# Patient Record
Sex: MALE | Race: WHITE | NOT HISPANIC OR LATINO | Employment: FULL TIME | URBAN - METROPOLITAN AREA
[De-identification: names, ages, dates, MRNs, and addresses within clinical notes are randomized per-mention and may not be internally consistent; named-entity substitution may affect disease eponyms.]

---

## 2017-01-11 ENCOUNTER — OFFICE VISIT (OUTPATIENT)
Dept: PEDIATRICS | Facility: CLINIC | Age: 47
End: 2017-01-11
Payer: COMMERCIAL

## 2017-01-11 VITALS
TEMPERATURE: 98.8 F | HEART RATE: 53 BPM | OXYGEN SATURATION: 97 % | DIASTOLIC BLOOD PRESSURE: 86 MMHG | SYSTOLIC BLOOD PRESSURE: 114 MMHG | WEIGHT: 230 LBS | BODY MASS INDEX: 28.6 KG/M2 | HEIGHT: 75 IN

## 2017-01-11 DIAGNOSIS — J06.9 VIRAL URI WITH COUGH: Primary | ICD-10-CM

## 2017-01-11 DIAGNOSIS — J02.8 ACUTE PHARYNGITIS DUE TO OTHER SPECIFIED ORGANISMS: ICD-10-CM

## 2017-01-11 LAB
DEPRECATED S PYO AG THROAT QL EIA: NORMAL
MICRO REPORT STATUS: NORMAL
SPECIMEN SOURCE: NORMAL

## 2017-01-11 PROCEDURE — 99213 OFFICE O/P EST LOW 20 MIN: CPT | Performed by: PHYSICIAN ASSISTANT

## 2017-01-11 PROCEDURE — 87081 CULTURE SCREEN ONLY: CPT | Performed by: PHYSICIAN ASSISTANT

## 2017-01-11 PROCEDURE — 87880 STREP A ASSAY W/OPTIC: CPT | Performed by: PHYSICIAN ASSISTANT

## 2017-01-11 NOTE — PROGRESS NOTES
"  SUBJECTIVE:                                                    Stefano Thompson is a 46 year old male who presents to clinic today for the following health issues:    Acute Illness   Acute illness concerns: URI issues  Onset: 1 month intermittent; symptoms over the past 3 days    Fever: YES- low grade a few weeks ago    Chills/Sweats: YES- slight chills    Headache (location?): no     Sinus Pressure:YES--mild    Conjunctivitis:  no    Ear Pain: YES: both    Rhinorrhea: YES    Congestion: YES    Sore Throat: YES     Cough: YES-productive     Lingering    No sob    Wheeze: no    Decreased Appetite: YES- slight    Nausea: no    Vomiting: no    Diarrhea:  YES- intermittent loose stool     Dysuria/Freq.: no    Fatigue/Achiness: YES--fatigued    Sick/Strep Exposure: no     Therapies Tried and outcome: mucinex, Nyquil cold and flu  No history of asthma, allergies. Nonsmoker.   Work: home     Problem list, Medication list, Allergies, and Medical/Social/Surgical histories reviewed in [facility] and updated as appropriate.    ROS:  ROS otherwise negative    OBJECTIVE:                                                    /86 mmHg  Pulse 53  Temp(Src) 98.8  F (37.1  C) (Oral)  Ht 6' 3\" (1.905 m)  Wt 230 lb (104.327 kg)  BMI 28.75 kg/m2  SpO2 97%  Body mass index is 28.75 kg/(m^2).   GENERAL: alert, no distress  HENT: ear canals- normal; TMs- normal; Nose- normal; Mouth- erythemic posterior pharynx; no sinus tenderness   NECK: no tenderness, no adenopathy  RESP: lungs clear to auscultation - no rales, no rhonchi, no wheezes  CV: regular rates and rhythm, normal S1 S2, no S3 or S4 and no murmur, no click or rub    Diagnostic test results:  Results for orders placed or performed in visit on 01/11/17 (from the past 24 hour(s))   Strep, Rapid Screen   Result Value Ref Range    Specimen Description Throat     Rapid Strep A Screen       NEGATIVE: No Group A streptococcal antigen detected by immunoassay, await   culture report.   "    Micro Report Status FINAL 01/11/2017         ASSESSMENT/PLAN:                                                    (J06.9,  B97.89) Viral URI with cough  (primary encounter diagnosis)  Comment: continue with symptomatic treatment. Call if symptoms persist or worsen.   Plan:     (J02.8) Acute pharyngitis due to other specified organisms  Comment:   Plan: Strep, Rapid Screen, Beta strep group A culture          See Patient Instructions    Hieu Fiore PA-C  [facility]

## 2017-01-11 NOTE — MR AVS SNAPSHOT
"              After Visit Summary   1/11/2017    Stefano Thompson    MRN: [id number]           Patient Information     Date Of Birth          1970        Visit Information        Provider Department      1/11/2017 9:10 AM Hieu Fiore PA-C [facility] [place]        Today's Diagnoses     Acute pharyngitis due to other specified organisms    -  1     Viral URI with cough           Care Instructions    Rapid strep test is negative. Throat culture is pending.     Continue to check temperatures three times daily. Continue with rest and fluids. May use ibuprofen or salt water rinses for symptomatic relief.     Do not share drinks/food with others.     Return to clinic if symptoms persist or worsen.           Follow-ups after your visit        Who to contact     If you have questions or need follow up information about today's clinic visit or your schedule please contact [facility] directly at [phone].  Normal or non-critical lab and imaging results will be communicated to you by MyChart, letter or phone within 4 business days after the clinic has received the results. If you do not hear from us within 7 days, please contact the clinic through [employer] or phone. If you have a critical or abnormal lab result, we will notify you by phone as soon as possible.  Submit refill requests through [employer] or call your pharmacy and they will forward the refill request to us. Please allow 3 business days for your refill to be completed.          Additional Information About Your Visit        MyChart Information     [employer] lets you send messages to your doctor, view your test results, renew your prescriptions, schedule appointments and more. To sign up, go to [email or web address] . Click on \"Log in\" on the left side of the screen, which will take you to the Welcome page. Then click on \"Sign up Now\" on the right side of the page.     You will be asked to enter the access code listed below, as " "well as some personal information. Please follow the directions to create your username and password.     Your access code is: [email or web address]  Expires: 2017  9:58 AM     Your access code will  in 90 days. If you need help or a new code, please call your [place] clinic or [phone].        Care EveryWhere ID     This is your Care EveryWhere ID. This could be used by other organizations to access your [place] medical records  [id number]        Your Vitals Were     Pulse Temperature Height BMI (Body Mass Index) Pulse Oximetry       53 98.8  F (37.1  C) (Oral) 6' 3\" (1.905 m) 28.75 kg/m2 97%        Blood Pressure from Last 3 Encounters:   17 114/86   16 136/64   12/09/15 110/68    Weight from Last 3 Encounters:   17 230 lb (104.327 kg)   16 226 lb (102.513 kg)   12/09/15 228 lb (103.42 kg)              We Performed the Following     Strep, Rapid Screen        Primary Care Provider    None       No address on file        Thank you!     Thank you for choosing [facility] [place]  for your care. Our goal is always to provide you with excellent care. Hearing back from our patients is one way we can continue to improve our services. Please take a few minutes to complete the written survey that you may receive in the mail after your visit with us. Thank you!             Your Updated Medication List - Protect others around you: Learn how to safely use, store and throw away your medicines at www.disposemymeds.org.      Notice  As of 2017  9:58 AM    You have not been prescribed any medications.      "

## 2017-01-11 NOTE — PATIENT INSTRUCTIONS
Rapid strep test is negative. Throat culture is pending.     Continue to check temperatures three times daily. Continue with rest and fluids. May use ibuprofen or salt water rinses for symptomatic relief.     Do not share drinks/food with others.     Return to clinic if symptoms persist or worsen.

## 2017-01-11 NOTE — NURSING NOTE
"Chief Complaint   Patient presents with     URI       Initial /86 mmHg  Pulse 53  Temp(Src) 98.8  F (37.1  C) (Oral)  Ht 6' 3\" (1.905 m)  Wt 230 lb (104.327 kg)  BMI 28.75 kg/m2  SpO2 97% Estimated body mass index is 28.75 kg/(m^2) as calculated from the following:    Height as of this encounter: 6' 3\" (1.905 m).    Weight as of this encounter: 230 lb (104.327 kg).  BP completed using cuff size: large.Brian BARRETT MA      "

## 2017-01-13 LAB
BACTERIA SPEC CULT: NORMAL
MICRO REPORT STATUS: NORMAL
SPECIMEN SOURCE: NORMAL

## 2017-01-17 ENCOUNTER — TELEPHONE (OUTPATIENT)
Dept: PEDIATRICS | Facility: CLINIC | Age: 47
End: 2017-01-17

## 2017-01-17 ENCOUNTER — OFFICE VISIT (OUTPATIENT)
Dept: PEDIATRICS | Facility: CLINIC | Age: 47
End: 2017-01-17
Payer: COMMERCIAL

## 2017-01-17 VITALS
BODY MASS INDEX: 28.6 KG/M2 | OXYGEN SATURATION: 98 % | DIASTOLIC BLOOD PRESSURE: 74 MMHG | SYSTOLIC BLOOD PRESSURE: 122 MMHG | WEIGHT: 230 LBS | HEIGHT: 75 IN | TEMPERATURE: 97.6 F | HEART RATE: 73 BPM

## 2017-01-17 DIAGNOSIS — L50.8 RECURRENT URTICARIA: Primary | ICD-10-CM

## 2017-01-17 PROCEDURE — 99213 OFFICE O/P EST LOW 20 MIN: CPT | Performed by: INTERNAL MEDICINE

## 2017-01-17 RX ORDER — PREDNISONE 20 MG/1
20 TABLET ORAL 2 TIMES DAILY
Qty: 10 TABLET | Refills: 0 | Status: SHIPPED | OUTPATIENT
Start: 2017-01-17 | End: 2019-01-20

## 2017-01-17 NOTE — TELEPHONE ENCOUNTER
Patient calling that he has had hives for a day.They are all over his body and move around. Denies SOB or throat tightness.  States he has traveled and it sometimes happens when he is exposed to different detergents. Wants a prescription for prednisone. States he has taken benadryl and had some left over prednisone 20 mg and took twice yesterday. States it helped but doesn't have any more. Advised he would need to be seen for prednisone and that it was not advisable to take prednisone without seeing a provider first. Appointment scheduled.  Jess Sifuentes RN

## 2017-01-17 NOTE — PATIENT INSTRUCTIONS
INSTRUCTIONS FOR TODAY:     prednisone for 5 days   benadryl 25-50mg each evening to manage itching   Zyrtec 10mg or Allegra 60mg each morning   hypoallergenic detergent- Dreft or other   schedule visit with Allergy    Dr Galo

## 2017-01-17 NOTE — PROGRESS NOTES
"  SUBJECTIVE:                                                    Stefano Thompson is a 46 year old male who presents to clinic today for the following health issues:    Rash      Duration: started yesterday    Description  Location: stomach, legs, back  Itching: mild    Intensity:  moderate    Accompanying signs and symptoms: None    History (similar episodes/previous evaluation): h/o recurrent urticaria past 3-4 years           Had used new detergent recently.  Denies other known allergens.  Denies chest tightness or throat discomfort or lip swelling    Precipitating or alleviating factors:  New exposures:  None  Recent travel: no      Therapies tried and outcome: none       Patient Active Problem List   Diagnosis     CARDIOVASCULAR SCREENING; LDL GOAL LESS THAN 160     Recurrent urticaria     Past Surgical History   Procedure Laterality Date     Graft skin full thickness from extremity         Social History   Substance Use Topics     Smoking status: Never Smoker      Smokeless tobacco: Never Used     Alcohol Use: 0.0 oz/week     0 Standard drinks or equivalent per week      Comment: 3 drinks per week     Family History   Problem Relation Age of Onset     C.A.D. No family hx of      DIABETES Father      type two     Hypertension Mother      CANCER No family hx of            OBJECTIVE:                                                    /74 mmHg  Pulse 73  Temp(Src) 97.6  F (36.4  C) (Oral)  Ht 6' 3\" (1.905 m)  Wt 230 lb (104.327 kg)  BMI 28.75 kg/m2  SpO2 98% Body mass index is 28.75 kg/(m^2).  GENERAL:  alert,  no distress  HENT: oropharynx-normal  Derm: patchy well demarcated areas of erythema with wheals and induration legs, abdomen, back     ASSESSMENT/PLAN:                                                        ICD-10-CM    1. Recurrent urticaria L50.8 predniSONE (DELTASONE) 20 MG tablet     ALLERGY/ASTHMA ADULT REFERRAL      Patient Instructions   INSTRUCTIONS FOR TODAY:     prednisone for 5 " days   benadryl 25-50mg each evening    Zyrtec 10mg or Allegra 60mg each morning   referred to Allergy    Joseph Galo MD  [facility]

## 2017-01-17 NOTE — MR AVS SNAPSHOT
After Visit Summary   1/17/2017    Stefano Thompson    MRN: [id number]           Patient Information     Date Of Birth          1970        Visit Information        Provider Department      1/17/2017 9:20 AM Joseph Galo MD [facility] [place]        Today's Diagnoses     Recurrent urticaria    -  1       Care Instructions    INSTRUCTIONS FOR TODAY:     prednisone for 5 days   benadryl 25-50mg each evening to manage itching   Zyrtec 10mg or Allegra 60mg each morning   hypoallergenic detergent- Dreft or other   schedule visit with Allergy    Dr Galo          Follow-ups after your visit        Additional Services     ALLERGY/ASTHMA ADULT REFERRAL       Your provider has referred you to: [facility]: [facility] [place] [phone]   [email or web address]  [facility]: [facility] [place] [phone]   [email or web address]  [facility]: [facility] [place] [phone]   [email or web address]  [place] [phone]   [email or web address]    Please be aware that coverage of these services is subject to the terms and limitations of your health insurance plan.  Call member services at your health plan with any benefit or coverage questions.      Please bring the following with you to your appointment:    (1) Any X-Rays, CTs or MRIs which have been performed.  Contact the facility where they were done to arrange for  prior to your scheduled appointment.    (2) List of current medications  (3) This referral request   (4) Any documents/labs given to you for this referral                  Who to contact     If you have questions or need follow up information about today's clinic visit or your schedule please contact [facility] directly at [phone].  Normal or non-critical lab and imaging results will be communicated to you by MyChart, letter or phone within 4 business days after the clinic has  "received the results. If you do not hear from us within 7 days, please contact the clinic through [employer] or phone. If you have a critical or abnormal lab result, we will notify you by phone as soon as possible.  Submit refill requests through [employer] or call your pharmacy and they will forward the refill request to us. Please allow 3 business days for your refill to be completed.          Additional Information About Your Visit        [employer] Information     [email or web address] lets you send messages to your doctor, view your test results, renew your prescriptions, schedule appointments and more. To sign up, go to [email or web address] . Click on \"Log in\" on the left side of the screen, which will take you to the Welcome page. Then click on \"Sign up Now\" on the right side of the page.     You will be asked to enter the access code listed below, as well as some personal information. Please follow the directions to create your username and password.     Your access code is: [email or web address]  Expires: 2017  9:58 AM     Your access code will  in 90 days. If you need help or a new code, please call your [place] clinic or [phone].        Care EveryWhere ID     This is your Care EveryWhere ID. This could be used by other organizations to access your [place] medical records  [id number]        Your Vitals Were     Pulse Temperature Height BMI (Body Mass Index) Pulse Oximetry       73 97.6  F (36.4  C) (Oral) 6' 3\" (1.905 m) 28.75 kg/m2 98%        Blood Pressure from Last 3 Encounters:   17 122/74   17 114/86   16 136/64    Weight from Last 3 Encounters:   17 230 lb (104.327 kg)   17 230 lb (104.327 kg)   16 226 lb (102.513 kg)              We Performed the Following     ALLERGY/ASTHMA ADULT REFERRAL          Today's Medication Changes          These changes are accurate as of: 17  9:59 AM.  If you have any questions, ask your nurse or doctor.               Start taking these " medicines.        Dose/Directions    predniSONE 20 MG tablet   Commonly known as:  DELTASONE   Used for:  Recurrent urticaria   Started by:  Joseph Galo MD        Dose:  20 mg   Take 1 tablet (20 mg) by mouth 2 times daily   Quantity:  10 tablet   Refills:  0            Where to get your medicines      These medications were sent to [id number] - [place], [place] - [address], [place] [address]     Phone:  [phone]    - predniSONE 20 MG tablet             Primary Care Provider    None       No address on file        Thank you!     Thank you for choosing [facility] [place]  for your care. Our goal is always to provide you with excellent care. Hearing back from our patients is one way we can continue to improve our services. Please take a few minutes to complete the written survey that you may receive in the mail after your visit with us. Thank you!             Your Updated Medication List - Protect others around you: Learn how to safely use, store and throw away your medicines at www.disposemymeds.org.          This list is accurate as of: 1/17/17  9:59 AM.  Always use your most recent med list.                   Brand Name Dispense Instructions for use    predniSONE 20 MG tablet    DELTASONE    10 tablet    Take 1 tablet (20 mg) by mouth 2 times daily

## 2017-07-27 ENCOUNTER — TRANSFERRED RECORDS (OUTPATIENT)
Dept: HEALTH INFORMATION MANAGEMENT | Facility: CLINIC | Age: 47
End: 2017-07-27

## 2017-08-29 ENCOUNTER — TRANSFERRED RECORDS (OUTPATIENT)
Dept: HEALTH INFORMATION MANAGEMENT | Facility: CLINIC | Age: 47
End: 2017-08-29

## 2017-09-14 ENCOUNTER — TRANSFERRED RECORDS (OUTPATIENT)
Dept: HEALTH INFORMATION MANAGEMENT | Facility: CLINIC | Age: 47
End: 2017-09-14

## 2017-10-12 ENCOUNTER — TRANSFERRED RECORDS (OUTPATIENT)
Dept: HEALTH INFORMATION MANAGEMENT | Facility: CLINIC | Age: 47
End: 2017-10-12

## 2017-10-31 ENCOUNTER — TRANSFERRED RECORDS (OUTPATIENT)
Dept: HEALTH INFORMATION MANAGEMENT | Facility: CLINIC | Age: 47
End: 2017-10-31

## 2018-03-28 ENCOUNTER — TRANSFERRED RECORDS (OUTPATIENT)
Dept: HEALTH INFORMATION MANAGEMENT | Facility: CLINIC | Age: 48
End: 2018-03-28

## 2019-01-20 ENCOUNTER — OFFICE VISIT (OUTPATIENT)
Dept: URGENT CARE | Facility: URGENT CARE | Age: 49
End: 2019-01-20
Payer: COMMERCIAL

## 2019-01-20 VITALS
OXYGEN SATURATION: 97 % | DIASTOLIC BLOOD PRESSURE: 81 MMHG | HEART RATE: 67 BPM | BODY MASS INDEX: 26.87 KG/M2 | SYSTOLIC BLOOD PRESSURE: 132 MMHG | TEMPERATURE: 97.7 F | WEIGHT: 215 LBS

## 2019-01-20 DIAGNOSIS — J01.90 ACUTE SINUSITIS WITH SYMPTOMS > 10 DAYS: Primary | ICD-10-CM

## 2019-01-20 PROCEDURE — 99213 OFFICE O/P EST LOW 20 MIN: CPT | Performed by: PHYSICIAN ASSISTANT

## 2019-01-20 RX ORDER — PREDNISONE 20 MG/1
40 TABLET ORAL DAILY
Qty: 10 TABLET | Refills: 0 | Status: SHIPPED | OUTPATIENT
Start: 2019-01-20 | End: 2019-01-25

## 2019-01-20 NOTE — PROGRESS NOTES
SUBJECTIVE:   Stefano Thompson is a 48 year old male presenting with a chief complaint of  cold sx with congestion and aches for months.  Viral and fighting it off but getting worse over the past weeks.    No SOB or chest pain no real cough .  Took Augmentin recently and was getting better but took far into the course to improve and worsened quickly after done.  Hx of sinus issues  Onset of symptoms was several  month(s) ago.  Course of illness is worsening.    Severity moderate  Current and Associated symptoms: negative other than stated above  Treatment measures tried include Tylenol/Ibuprofen, Fluids, Rest and OTC med.  Predisposing factors include HX of chronic sinusitis.    Past Medical History:   Diagnosis Date     Unspecified sinusitis (chronic)      No current outpatient medications on file.     Social History     Tobacco Use     Smoking status: Never Smoker     Smokeless tobacco: Never Used   Substance Use Topics     Alcohol use: Yes     Alcohol/week: 0.0 oz     Comment: 3 drinks per week       ROS:  Review of systems negative except as stated above.    OBJECTIVE:  /81   Pulse 67   Temp 97.7  F (36.5  C) (Tympanic)   Wt 97.5 kg (215 lb)   SpO2 97%   BMI 26.87 kg/m    GENERAL APPEARANCE: healthy, alert and no distress  EYES: EOMI,  PERRL, conjunctiva clear  HENT: TM's normal bilaterally, nasal turbinates erythematous, swollen, rhinorrhea clear and thick, oral mucous membranes moist, no erythema noted and maxillary sinus tenderness and sx worse on the left   NECK: supple, nontender, no lymphadenopathy  RESP: lungs clear to auscultation - no rales, rhonchi or wheezes  CV: regular rates and rhythm, normal S1 S2, no murmur noted  NEURO: Normal strength and tone, sensory exam grossly normal,  normal speech and mentation  SKIN: no suspicious lesions or rashes    assessment/plan:  (J01.90) Acute sinusitis with symptoms > 10 days  (primary encounter diagnosis)  Comment:   Plan: predniSONE (DELTASONE) 20  MG tablet,         amoxicillin-clavulanate (AUGMENTIN) 875-125 MG         tablet          OTC med for sx relief, hot packs to face, steam and Augmentin and Prednisone as directed.  Follow-up with PCP as needed

## 2019-06-27 ENCOUNTER — OFFICE VISIT (OUTPATIENT)
Dept: PEDIATRICS | Facility: CLINIC | Age: 49
End: 2019-06-27
Payer: COMMERCIAL

## 2019-06-27 VITALS
HEART RATE: 53 BPM | SYSTOLIC BLOOD PRESSURE: 120 MMHG | WEIGHT: 216.9 LBS | OXYGEN SATURATION: 97 % | BODY MASS INDEX: 27.11 KG/M2 | DIASTOLIC BLOOD PRESSURE: 54 MMHG | TEMPERATURE: 98.1 F

## 2019-06-27 DIAGNOSIS — M25.471 RIGHT ANKLE SWELLING: Primary | ICD-10-CM

## 2019-06-27 LAB — D DIMER PPP FEU-MCNC: 0.7 UG/ML FEU (ref 0–0.5)

## 2019-06-27 PROCEDURE — 36415 COLL VENOUS BLD VENIPUNCTURE: CPT | Performed by: FAMILY MEDICINE

## 2019-06-27 PROCEDURE — 99214 OFFICE O/P EST MOD 30 MIN: CPT | Performed by: FAMILY MEDICINE

## 2019-06-27 PROCEDURE — 85379 FIBRIN DEGRADATION QUANT: CPT | Performed by: FAMILY MEDICINE

## 2019-06-27 NOTE — PATIENT INSTRUCTIONS
We will call you if that test comes back abnormal in the next 24 hours.    If the swelling/pain gets worse or travels upwards please come in immediately to be seen.

## 2019-06-27 NOTE — PROGRESS NOTES
Subjective:   Stefano Thompson is a 49 year old male who presents for   Chief Complaint   Patient presents with     Leg Swelling     start 1 week sx right leg above the ankle, swelling, painful to the touch, slight discoloration and warmth, no hx of similar sx tx Ice, Ibuprofen last dose in morning      About 1 week in presentation on the medial side of ankle above the bone. Discomfort to the touch. Recalls rolling his ankle on this right side but on the lateral side but no other significant event that he notes. No redness/swelling going up the leg.   He does do a lot of bike riding. Has no pain or discomfort radiating up the leg. Less sore than what it was a few days ago.     No family history of blood clots. Denies smoking. No personal history of blood clots.   No recent immobilization or long distance traveling. 5 months since last plane flight.       Patient Active Problem List    Diagnosis Date Noted     Recurrent urticaria 01/17/2017     Priority: Medium     CARDIOVASCULAR SCREENING; LDL GOAL LESS THAN 160 10/31/2010     Priority: Medium       No current outpatient medications on file.     No current facility-administered medications for this visit.        ROS:  As above per HPI    Objective:   /54 (BP Location: Right arm, Patient Position: Chair, Cuff Size: Adult Regular)   Pulse 53   Temp 98.1  F (36.7  C) (Oral)   Wt 98.4 kg (216 lb 14.4 oz)   SpO2 97%   BMI 27.11 kg/m  , Body mass index is 27.11 kg/m .  Gen:  NAD, well-nourished, sitting in chair comfortably  HEENT: EOMI, sclera anicteric, Head normocephalic, ; nares patent; moist mucous membranes  Neck: trachea midline, no thyromegaly  CV:  Hemodynamically stable  Pulm:  no increased work of breathing , CTAB, no wheezes/rales/rhonchi   Extrem: no cyanosis, edema or clubbing, 17 inch circumference of R side / 16.5 inch circumference of L side 10cm below tibial tuberosity. More prominent superficial veins of right lower extremity compared to  left  Skin: no obvious rashes or abnormalities  Psych: Euthymic, linear thoughts, normal rate of speech    Results for orders placed or performed in visit on 06/27/19   D dimer, quantitative   Result Value Ref Range    D Dimer 0.7 (H) 0.0 - 0.50 ug/ml FEU         Assessment & Plan:   Stefano Thompson, 49 year old male who presents with:  Right ankle swelling  Using well's criteria only positive criteria was subjective prominence of veins of his right lower leg although this is been present prior to his onset of symptoms with this in mind we discussed use of the D-dimer test is a sensitive told to rule out DVT unfortunately this came back elevated I discussed the result with patient over the phone given the location of his swelling and that it actually has improved over the last few days he can continue to monitor symptoms ; compression ultrasound is not typically done in his area his symptoms are not remotely close to being a proximal DVT --recommend close monitoring for any worsening of symptoms especially in the calf or thigh area.  Patient is comfortable with the plan.  - D dimer, quantitative      Jaylan Johnston MD   [place] [facility]    The use of Dragon/[employer] dictation services may have been used to construct the content in this note; any grammatical or spelling errors are non-intentional. Please contact the author of this note directly if you are in need of any clarification.

## 2019-11-04 ENCOUNTER — HEALTH MAINTENANCE LETTER (OUTPATIENT)
Age: 49
End: 2019-11-04

## 2020-08-20 ENCOUNTER — OFFICE VISIT (OUTPATIENT)
Dept: URGENT CARE | Facility: URGENT CARE | Age: 50
End: 2020-08-20
Payer: COMMERCIAL

## 2020-08-20 VITALS
SYSTOLIC BLOOD PRESSURE: 136 MMHG | TEMPERATURE: 97.8 F | BODY MASS INDEX: 27.12 KG/M2 | DIASTOLIC BLOOD PRESSURE: 74 MMHG | HEART RATE: 50 BPM | WEIGHT: 217 LBS | RESPIRATION RATE: 12 BRPM | OXYGEN SATURATION: 97 %

## 2020-08-20 DIAGNOSIS — H65.01 NON-RECURRENT ACUTE SEROUS OTITIS MEDIA OF RIGHT EAR: Primary | ICD-10-CM

## 2020-08-20 PROCEDURE — 99213 OFFICE O/P EST LOW 20 MIN: CPT | Performed by: NURSE PRACTITIONER

## 2020-08-20 RX ORDER — AMOXICILLIN 875 MG
875 TABLET ORAL 2 TIMES DAILY
Qty: 14 TABLET | Refills: 0 | Status: SHIPPED | OUTPATIENT
Start: 2020-08-20 | End: 2020-08-27

## 2020-08-20 NOTE — PROGRESS NOTES
Chief Complaint   Patient presents with     Urgent Care     Ear Problem     Ptt has had some right ear pain x 3 days and seems to have some pain radiating down his neck a bit.  He says he has not had a fever.  Just wants to have it checked out.  No ear draiange.     SUBJECTIVE:  Stefano Thompson is a 50 year old male who presents for evaluation of right ear pain, fullness and pressure for 3 day(s). He is going to [place] to drop his daughter off at college tomorrow.  Severity: moderate   Timing: gradual onset and worsening  Additional symptoms include lymph nodes  Treatment measures tried include: none.  History of recurrent otitis: no  Predisposing factors include: nothing.    Past Medical History:   Diagnosis Date     Unspecified sinusitis (chronic)      No current outpatient medications on file prior to visit.  No current facility-administered medications on file prior to visit.     Social History     Tobacco Use     Smoking status: Never Smoker     Smokeless tobacco: Never Used   Substance Use Topics     Alcohol use: Yes     Alcohol/week: 0.0 standard drinks     Comment: 3 drinks per week     Allergies   Allergen Reactions     No Known Drug Allergies      Review of Systems   Constitutional: Negative for appetite change, chills, diaphoresis, fatigue and fever.   HENT: Positive for ear pain. Negative for congestion, ear discharge, rhinorrhea, sneezing and sore throat.    Eyes: Negative for photophobia, pain, discharge, redness, itching and visual disturbance.   Respiratory: Negative for cough, chest tightness, shortness of breath, wheezing and stridor.    Cardiovascular: Negative for chest pain, palpitations and peripheral edema.   Gastrointestinal: Negative for abdominal pain, nausea and vomiting.   Musculoskeletal: Negative for myalgias.   Skin: Negative for rash.   Allergic/Immunologic: Negative for environmental allergies (unsure) and food allergies.   Neurological: Negative for dizziness, syncope, weakness,  headaches and paresthesias.   Hematological: Positive for adenopathy.   Psychiatric/Behavioral: Negative for sleep disturbance.       OBJECTIVE:  /74 (BP Location: Right arm, Patient Position: Sitting, Cuff Size: Adult Regular)   Pulse 50   Temp 97.8  F (36.6  C) (Tympanic)   Resp 12   Wt 98.4 kg (217 lb)   SpO2 97%   BMI 27.12 kg/m       Physical Exam  Vitals signs reviewed.   Constitutional:       General: He is not in acute distress.     Appearance: Normal appearance. He is not ill-appearing, toxic-appearing or diaphoretic.   HENT:      Head: Normocephalic and atraumatic.      Ears:      Comments: Bilateral serous ear effusions with slight erythema and yellow mucoid appearance. Tympanic membranes are bulging. Canals WDL.     Nose: Nose normal. No congestion or rhinorrhea.      Mouth/Throat:      Mouth: Mucous membranes are moist.      Pharynx: No oropharyngeal exudate or posterior oropharyngeal erythema.   Neck:      Musculoskeletal: Normal range of motion and neck supple.   Cardiovascular:      Rate and Rhythm: Normal rate.   Pulmonary:      Effort: Pulmonary effort is normal.      Breath sounds: Normal breath sounds.   Abdominal:      General: Abdomen is flat.      Palpations: Abdomen is soft.   Musculoskeletal: Normal range of motion.   Lymphadenopathy:      Cervical: Cervical adenopathy (submandibular, preauricular) present.   Skin:     General: Skin is warm and dry.      Findings: No rash.   Neurological:      General: No focal deficit present.      Mental Status: He is alert and oriented to person, place, and time.   Psychiatric:         Mood and Affect: Mood normal.         Behavior: Behavior normal.       ASSESSMENT:    ICD-10-CM    1. Non-recurrent acute serous otitis media of right ear  H65.01 amoxicillin (AMOXIL) 875 MG tablet       PLAN:   Patient Instructions   Amox for early ear infection  Zyrtec and benadryl for seasonal allergies / serous ear effusions  Can premedicate prior to  [place] flight with sudafed as long as hypertension is not a concern  Tylenol and/or motrin for pain relief and fever reduction.  Warm compresses next to ear for pain relief.  Drink plenty of fluids and place a humidifier in bedroom.  Ear infections are not contagious.  Swimming is ok as long as there is no perforation in the ear drum.   Follow up with primary care provider 10-14 days after starting the antibiotic with any concern of persistent infection.    Follow up with primary care provider with any problems, questions or concerns or if symptoms worsen or fail to improve. Patient agreed to plan and verbalized understanding.    Grace Verduzco, [facility]

## 2020-08-20 NOTE — NURSING NOTE
"Chief Complaint   Patient presents with     Urgent Care     Ear Problem     Ptt has had some right ear pain x 3 days and seems to have some pain radiating down his neck a bit.  He says he has not had a fever.  Just wants to have it checked out.  No ear draiange.     Initial /74 (BP Location: Right arm, Patient Position: Sitting, Cuff Size: Adult Regular)   Pulse 50   Temp 97.8  F (36.6  C) (Tympanic)   Resp 12   Wt 98.4 kg (217 lb)   SpO2 97%   BMI 27.12 kg/m   Estimated body mass index is 27.12 kg/m  as calculated from the following:    Height as of 1/17/17: 1.905 m (6' 3\").    Weight as of this encounter: 98.4 kg (217 lb)..  BP completed using cuff size: regular  Michelle Mijares R.N.    "

## 2020-08-21 ASSESSMENT — ENCOUNTER SYMPTOMS
ABDOMINAL PAIN: 0
FEVER: 0
WEAKNESS: 0
SLEEP DISTURBANCE: 0
DIZZINESS: 0
PARESTHESIAS: 0
CHILLS: 0
EYE DISCHARGE: 0
DIAPHORESIS: 0
HEADACHES: 0
STRIDOR: 0
COUGH: 0
APPETITE CHANGE: 0
SHORTNESS OF BREATH: 0
FATIGUE: 0
NAUSEA: 0
PALPITATIONS: 0
MYALGIAS: 0
RHINORRHEA: 0
EYE ITCHING: 0
VOMITING: 0
ADENOPATHY: 1
SORE THROAT: 0
WHEEZING: 0
PHOTOPHOBIA: 0
CHEST TIGHTNESS: 0
EYE PAIN: 0
EYE REDNESS: 0

## 2020-08-21 NOTE — PATIENT INSTRUCTIONS
Amox for early ear infection  Zyrtec and benadryl for seasonal allergies / serous ear effusions  Can premedicate prior to [place] flight with sudafed as long as hypertension is not a concern  Tylenol and/or motrin for pain relief and fever reduction.  Warm compresses next to ear for pain relief.  Drink plenty of fluids and place a humidifier in bedroom.  Ear infections are not contagious.  Swimming is ok as long as there is no perforation in the ear drum.   Follow up with primary care provider 10-14 days after starting the antibiotic with any concern of persistent infection.

## 2020-11-16 ENCOUNTER — HEALTH MAINTENANCE LETTER (OUTPATIENT)
Age: 50
End: 2020-11-16

## 2021-09-18 ENCOUNTER — HEALTH MAINTENANCE LETTER (OUTPATIENT)
Age: 51
End: 2021-09-18

## 2022-01-08 ENCOUNTER — HEALTH MAINTENANCE LETTER (OUTPATIENT)
Age: 52
End: 2022-01-08

## 2022-11-20 ENCOUNTER — HEALTH MAINTENANCE LETTER (OUTPATIENT)
Age: 52
End: 2022-11-20

## 2023-04-09 ENCOUNTER — HOSPITAL ENCOUNTER (OUTPATIENT)
Facility: HOSPITAL | Age: 53
Discharge: HOME | End: 2023-04-09
Attending: SURGERY | Admitting: SURGERY
Payer: COMMERCIAL

## 2023-04-09 ENCOUNTER — ANESTHESIA EVENT (OUTPATIENT)
Dept: SURGERY | Facility: HOSPITAL | Age: 53
End: 2023-04-09
Payer: COMMERCIAL

## 2023-04-09 ENCOUNTER — ANESTHESIA (OUTPATIENT)
Dept: SURGERY | Facility: HOSPITAL | Age: 53
End: 2023-04-09
Payer: COMMERCIAL

## 2023-04-09 ENCOUNTER — HOSPITAL ENCOUNTER (EMERGENCY)
Facility: HOSPITAL | Age: 53
End: 2023-04-09
Attending: SURGERY | Admitting: SURGERY
Payer: COMMERCIAL

## 2023-04-09 PROCEDURE — 999N000141 HC STATISTIC PRE-PROCEDURE NURSING ASSESSMENT: Performed by: SURGERY

## 2023-04-09 PROCEDURE — 250N000025 HC SEVOFLURANE, PER MIN: Performed by: SURGERY

## 2023-04-09 PROCEDURE — 710N000009 HC RECOVERY PHASE 1, LEVEL 1, PER MIN: Performed by: SURGERY

## 2023-04-09 PROCEDURE — 99204 OFFICE O/P NEW MOD 45 MIN: CPT | Mod: 57 | Performed by: SURGERY

## 2023-04-09 PROCEDURE — 360N000076 HC SURGERY LEVEL 3, PER MIN: Performed by: SURGERY

## 2023-04-09 PROCEDURE — 370N000017 HC ANESTHESIA TECHNICAL FEE, PER MIN: Performed by: SURGERY

## 2023-04-09 PROCEDURE — 258N000003 HC RX IP 258 OP 636: Performed by: NURSE ANESTHETIST, CERTIFIED REGISTERED

## 2023-04-09 PROCEDURE — 710N000012 HC RECOVERY PHASE 2, PER MINUTE: Performed by: SURGERY

## 2023-04-09 PROCEDURE — 250N000011 HC RX IP 250 OP 636: Performed by: SURGERY

## 2023-04-09 PROCEDURE — 272N000001 HC OR GENERAL SUPPLY STERILE: Performed by: SURGERY

## 2023-04-09 PROCEDURE — 250N000011 HC RX IP 250 OP 636: Performed by: NURSE ANESTHETIST, CERTIFIED REGISTERED

## 2023-04-09 RX ORDER — CEFAZOLIN SODIUM/WATER 2 G/20 ML
2 SYRINGE (ML) INTRAVENOUS
Status: COMPLETED | OUTPATIENT
Start: 2023-04-09 | End: 2023-04-09

## 2023-04-09 RX ORDER — CEFAZOLIN SODIUM/WATER 2 G/20 ML
2 SYRINGE (ML) INTRAVENOUS SEE ADMIN INSTRUCTIONS
Status: DISCONTINUED | OUTPATIENT
Start: 2023-04-09 | End: 2023-04-09 | Stop reason: HOSPADM

## 2023-04-09 RX ORDER — CEFAZOLIN SODIUM/WATER 2 G/20 ML
2 SYRINGE (ML) INTRAVENOUS SEE ADMIN INSTRUCTIONS
Status: DISCONTINUED | OUTPATIENT
Start: 2023-04-09 | End: 2023-04-10 | Stop reason: HOSPADM

## 2023-04-09 RX ORDER — CEFAZOLIN SODIUM/WATER 2 G/20 ML
2 SYRINGE (ML) INTRAVENOUS
Status: DISCONTINUED | OUTPATIENT
Start: 2023-04-09 | End: 2023-04-09 | Stop reason: HOSPADM

## 2023-04-09 RX ADMIN — MIDAZOLAM 2 MG: 1 INJECTION INTRAMUSCULAR; INTRAVENOUS at 23:58

## 2023-04-09 RX ADMIN — SODIUM CHLORIDE, POTASSIUM CHLORIDE, SODIUM LACTATE AND CALCIUM CHLORIDE: 600; 310; 30; 20 INJECTION, SOLUTION INTRAVENOUS at 23:45

## 2023-04-09 RX ADMIN — Medication 2 G: at 23:45

## 2023-04-10 ENCOUNTER — HOSPITAL ENCOUNTER (OUTPATIENT)
Facility: HOSPITAL | Age: 53
Discharge: HOME | End: 2023-04-10
Attending: SURGERY | Admitting: SURGERY
Payer: COMMERCIAL

## 2023-04-10 VITALS
DIASTOLIC BLOOD PRESSURE: 84 MMHG | RESPIRATION RATE: 16 BRPM | TEMPERATURE: 97.8 F | OXYGEN SATURATION: 97 % | SYSTOLIC BLOOD PRESSURE: 134 MMHG | WEIGHT: 222 LBS | HEART RATE: 54 BPM | HEIGHT: 75 IN | BODY MASS INDEX: 27.6 KG/M2

## 2023-04-10 DIAGNOSIS — K35.30 ACUTE APPENDICITIS WITH LOCALIZED PERITONITIS WITHOUT ABSCESS, UNSPECIFIED WHETHER GANGRENE PRESENT, UNSPECIFIED WHETHER PERFORATION PRESENT: Primary | ICD-10-CM

## 2023-04-10 PROCEDURE — 88304 TISSUE EXAM BY PATHOLOGIST: CPT | Mod: 26 | Performed by: PATHOLOGY

## 2023-04-10 PROCEDURE — 250N000009 HC RX 250: Performed by: SURGERY

## 2023-04-10 PROCEDURE — 44970 LAPAROSCOPY APPENDECTOMY: CPT | Performed by: SURGERY

## 2023-04-10 PROCEDURE — 250N000009 HC RX 250: Performed by: NURSE ANESTHETIST, CERTIFIED REGISTERED

## 2023-04-10 PROCEDURE — 88304 TISSUE EXAM BY PATHOLOGIST: CPT | Mod: TC | Performed by: SURGERY

## 2023-04-10 PROCEDURE — 250N000011 HC RX IP 250 OP 636

## 2023-04-10 PROCEDURE — 250N000013 HC RX MED GY IP 250 OP 250 PS 637: Performed by: ANESTHESIOLOGY

## 2023-04-10 PROCEDURE — 250N000011 HC RX IP 250 OP 636: Performed by: NURSE ANESTHETIST, CERTIFIED REGISTERED

## 2023-04-10 RX ORDER — ONDANSETRON 2 MG/ML
4 INJECTION INTRAMUSCULAR; INTRAVENOUS ONCE
Status: COMPLETED | OUTPATIENT
Start: 2023-04-10 | End: 2023-04-10

## 2023-04-10 RX ORDER — SODIUM CHLORIDE, SODIUM LACTATE, POTASSIUM CHLORIDE, CALCIUM CHLORIDE 600; 310; 30; 20 MG/100ML; MG/100ML; MG/100ML; MG/100ML
INJECTION, SOLUTION INTRAVENOUS CONTINUOUS
Status: DISCONTINUED | OUTPATIENT
Start: 2023-04-10 | End: 2023-04-10 | Stop reason: HOSPADM

## 2023-04-10 RX ORDER — HYDROMORPHONE HYDROCHLORIDE 1 MG/ML
0.2 INJECTION, SOLUTION INTRAMUSCULAR; INTRAVENOUS; SUBCUTANEOUS EVERY 5 MIN PRN
Status: DISCONTINUED | OUTPATIENT
Start: 2023-04-10 | End: 2023-04-10 | Stop reason: HOSPADM

## 2023-04-10 RX ORDER — OXYCODONE HYDROCHLORIDE 5 MG/1
5 TABLET ORAL
Status: DISCONTINUED | OUTPATIENT
Start: 2023-04-10 | End: 2023-04-10 | Stop reason: HOSPADM

## 2023-04-10 RX ORDER — ONDANSETRON 2 MG/ML
4 INJECTION INTRAMUSCULAR; INTRAVENOUS EVERY 30 MIN PRN
Status: DISCONTINUED | OUTPATIENT
Start: 2023-04-10 | End: 2023-04-10 | Stop reason: HOSPADM

## 2023-04-10 RX ORDER — FENTANYL CITRATE 50 UG/ML
25 INJECTION, SOLUTION INTRAMUSCULAR; INTRAVENOUS EVERY 5 MIN PRN
Status: DISCONTINUED | OUTPATIENT
Start: 2023-04-10 | End: 2023-04-10 | Stop reason: HOSPADM

## 2023-04-10 RX ORDER — NALOXONE HYDROCHLORIDE 1 MG/ML
0.2 INJECTION INTRAMUSCULAR; INTRAVENOUS; SUBCUTANEOUS
Status: DISCONTINUED | OUTPATIENT
Start: 2023-04-10 | End: 2023-04-10 | Stop reason: HOSPADM

## 2023-04-10 RX ORDER — FENTANYL CITRATE 50 UG/ML
INJECTION, SOLUTION INTRAMUSCULAR; INTRAVENOUS PRN
Status: DISCONTINUED | OUTPATIENT
Start: 2023-04-10 | End: 2023-04-10

## 2023-04-10 RX ORDER — KETAMINE HYDROCHLORIDE 10 MG/ML
INJECTION INTRAMUSCULAR; INTRAVENOUS PRN
Status: DISCONTINUED | OUTPATIENT
Start: 2023-04-10 | End: 2023-04-10

## 2023-04-10 RX ORDER — DEXAMETHASONE SODIUM PHOSPHATE 10 MG/ML
INJECTION, SOLUTION INTRAMUSCULAR; INTRAVENOUS PRN
Status: DISCONTINUED | OUTPATIENT
Start: 2023-04-10 | End: 2023-04-10

## 2023-04-10 RX ORDER — ONDANSETRON 2 MG/ML
INJECTION INTRAMUSCULAR; INTRAVENOUS PRN
Status: DISCONTINUED | OUTPATIENT
Start: 2023-04-10 | End: 2023-04-10

## 2023-04-10 RX ORDER — MAGNESIUM HYDROXIDE 1200 MG/15ML
LIQUID ORAL PRN
Status: DISCONTINUED | OUTPATIENT
Start: 2023-04-10 | End: 2023-04-10 | Stop reason: HOSPADM

## 2023-04-10 RX ORDER — IBUPROFEN 600 MG/1
600 TABLET, FILM COATED ORAL EVERY 6 HOURS PRN
Qty: 30 TABLET | Refills: 0 | Status: SHIPPED | OUTPATIENT
Start: 2023-04-10

## 2023-04-10 RX ORDER — ONDANSETRON 2 MG/ML
INJECTION INTRAMUSCULAR; INTRAVENOUS
Status: COMPLETED
Start: 2023-04-10 | End: 2023-04-10

## 2023-04-10 RX ORDER — NALOXONE HYDROCHLORIDE 1 MG/ML
0.4 INJECTION INTRAMUSCULAR; INTRAVENOUS; SUBCUTANEOUS
Status: DISCONTINUED | OUTPATIENT
Start: 2023-04-10 | End: 2023-04-10 | Stop reason: HOSPADM

## 2023-04-10 RX ORDER — LIDOCAINE HYDROCHLORIDE 10 MG/ML
INJECTION, SOLUTION INFILTRATION; PERINEURAL PRN
Status: DISCONTINUED | OUTPATIENT
Start: 2023-04-10 | End: 2023-04-10

## 2023-04-10 RX ORDER — OXYCODONE HYDROCHLORIDE 5 MG/1
5-10 TABLET ORAL EVERY 4 HOURS PRN
Qty: 10 TABLET | Refills: 0 | Status: SHIPPED | OUTPATIENT
Start: 2023-04-10 | End: 2023-04-11

## 2023-04-10 RX ORDER — FENTANYL CITRATE 50 UG/ML
50 INJECTION, SOLUTION INTRAMUSCULAR; INTRAVENOUS EVERY 5 MIN PRN
Status: DISCONTINUED | OUTPATIENT
Start: 2023-04-10 | End: 2023-04-10 | Stop reason: HOSPADM

## 2023-04-10 RX ORDER — ONDANSETRON 4 MG/1
4 TABLET, ORALLY DISINTEGRATING ORAL EVERY 30 MIN PRN
Status: DISCONTINUED | OUTPATIENT
Start: 2023-04-10 | End: 2023-04-10 | Stop reason: HOSPADM

## 2023-04-10 RX ORDER — ACETAMINOPHEN 325 MG/1
650 TABLET ORAL EVERY 4 HOURS PRN
Qty: 50 TABLET | Refills: 0 | Status: SHIPPED | OUTPATIENT
Start: 2023-04-10

## 2023-04-10 RX ORDER — HYDROMORPHONE HYDROCHLORIDE 1 MG/ML
0.4 INJECTION, SOLUTION INTRAMUSCULAR; INTRAVENOUS; SUBCUTANEOUS EVERY 5 MIN PRN
Status: DISCONTINUED | OUTPATIENT
Start: 2023-04-10 | End: 2023-04-10 | Stop reason: HOSPADM

## 2023-04-10 RX ORDER — IBUPROFEN 200 MG
400 TABLET ORAL
Status: DISCONTINUED | OUTPATIENT
Start: 2023-04-10 | End: 2023-04-10 | Stop reason: HOSPADM

## 2023-04-10 RX ORDER — OXYCODONE HYDROCHLORIDE 5 MG/1
5 TABLET ORAL
Status: COMPLETED | OUTPATIENT
Start: 2023-04-10 | End: 2023-04-10

## 2023-04-10 RX ORDER — PROPOFOL 10 MG/ML
INJECTION, EMULSION INTRAVENOUS PRN
Status: DISCONTINUED | OUTPATIENT
Start: 2023-04-10 | End: 2023-04-10

## 2023-04-10 RX ORDER — BUPIVACAINE HYDROCHLORIDE AND EPINEPHRINE 2.5; 5 MG/ML; UG/ML
INJECTION, SOLUTION INFILTRATION; PERINEURAL PRN
Status: DISCONTINUED | OUTPATIENT
Start: 2023-04-10 | End: 2023-04-10 | Stop reason: HOSPADM

## 2023-04-10 RX ORDER — OXYCODONE HYDROCHLORIDE 5 MG/1
10 TABLET ORAL
Status: DISCONTINUED | OUTPATIENT
Start: 2023-04-10 | End: 2023-04-10 | Stop reason: HOSPADM

## 2023-04-10 RX ORDER — LIDOCAINE 40 MG/G
CREAM TOPICAL
Status: DISCONTINUED | OUTPATIENT
Start: 2023-04-10 | End: 2023-04-10 | Stop reason: HOSPADM

## 2023-04-10 RX ORDER — SODIUM CHLORIDE, SODIUM LACTATE, POTASSIUM CHLORIDE, CALCIUM CHLORIDE 600; 310; 30; 20 MG/100ML; MG/100ML; MG/100ML; MG/100ML
INJECTION, SOLUTION INTRAVENOUS CONTINUOUS PRN
Status: DISCONTINUED | OUTPATIENT
Start: 2023-04-09 | End: 2023-04-10

## 2023-04-10 RX ADMIN — ONDANSETRON 4 MG: 2 INJECTION INTRAMUSCULAR; INTRAVENOUS at 00:06

## 2023-04-10 RX ADMIN — DEXAMETHASONE SODIUM PHOSPHATE 10 MG: 10 INJECTION, SOLUTION INTRAMUSCULAR; INTRAVENOUS at 00:06

## 2023-04-10 RX ADMIN — OXYCODONE HYDROCHLORIDE 5 MG: 5 TABLET ORAL at 02:25

## 2023-04-10 RX ADMIN — FENTANYL CITRATE 100 MCG: 50 INJECTION, SOLUTION INTRAMUSCULAR; INTRAVENOUS at 00:02

## 2023-04-10 RX ADMIN — ONDANSETRON 4 MG: 2 INJECTION INTRAMUSCULAR; INTRAVENOUS at 01:48

## 2023-04-10 RX ADMIN — SUGAMMADEX 200 MG: 100 INJECTION, SOLUTION INTRAVENOUS at 00:41

## 2023-04-10 RX ADMIN — ROCURONIUM BROMIDE 50 MG: 50 INJECTION, SOLUTION INTRAVENOUS at 00:02

## 2023-04-10 RX ADMIN — KETAMINE HYDROCHLORIDE 50 MG: 10 INJECTION, SOLUTION INTRAMUSCULAR; INTRAVENOUS at 00:02

## 2023-04-10 RX ADMIN — PROPOFOL 200 MG: 10 INJECTION, EMULSION INTRAVENOUS at 00:02

## 2023-04-10 RX ADMIN — LIDOCAINE HYDROCHLORIDE 30 MG: 10 INJECTION, SOLUTION INFILTRATION; PERINEURAL at 00:02

## 2023-04-10 ASSESSMENT — ACTIVITIES OF DAILY LIVING (ADL): ADLS_ACUITY_SCORE: 33

## 2023-04-10 NOTE — ANESTHESIA PREPROCEDURE EVALUATION
Anesthesia Pre-Procedure Evaluation    Patient: Stefano Thompson   MRN: [id number] : 1970        Procedure : Procedure(s):  APPENDECTOMY, LAPAROSCOPIC          Past Medical History:   Diagnosis Date     Unspecified sinusitis (chronic)       Past Surgical History:   Procedure Laterality Date     GRAFT SKIN FULL THICKNESS FROM EXTREMITY        Allergies   Allergen Reactions     No Known Drug Allergies       Social History     Tobacco Use     Smoking status: Never     Smokeless tobacco: Never   Vaping Use     Vaping status: Not on file   Substance Use Topics     Alcohol use: Yes     Alcohol/week: 0.0 standard drinks of alcohol     Comment: 3 drinks per week      Wt Readings from Last 1 Encounters:   23 100.7 kg (222 lb)        Anesthesia Evaluation   Pt has had prior anesthetic.     No history of anesthetic complications       ROS/MED HX  ENT/Pulmonary:       Neurologic:       Cardiovascular:       METS/Exercise Tolerance:     Hematologic:       Musculoskeletal:       GI/Hepatic:       Renal/Genitourinary:       Endo:       Psychiatric/Substance Use:       Infectious Disease:       Malignancy:       Other:            Physical Exam    Airway        Mallampati: I    Neck ROM: full     Respiratory Devices and Support         Dental       (+) Minor Abnormalities - some fillings, tiny chips      Cardiovascular   cardiovascular exam normal          Pulmonary   pulmonary exam normal                OUTSIDE LABS:  CBC: No results found for: WBC, HGB, HCT, PLT  BMP:   Lab Results   Component Value Date    GLC 83 11/10/2003     COAGS: No results found for: PTT, INR, FIBR  POC: No results found for: BGM, HCG, HCGS  HEPATIC: No results found for: ALBUMIN, PROTTOTAL, ALT, AST, GGT, ALKPHOS, BILITOTAL, BILIDIRECT, UBALDO  OTHER: No results found for: PH, LACT, A1C, MARY, PHOS, MAG, LIPASE, AMYLASE, TSH, T4, T3, CRP, SED    Anesthesia Plan    ASA Status:  1, emergent       Anesthesia Type: General.     - Airway: ETT    Induction: RSI.           Consents    Anesthesia Plan(s) and associated risks, benefits, and realistic alternatives discussed. Questions answered and patient/representative(s) expressed understanding.     - Discussed: Risks, Benefits and Alternatives for the PROCEDURE were discussed     - Discussed with:  Patient      - Extended Intubation/Ventilatory Support Discussed: No.      - Patient is DNR/DNI Status: No    Use of blood products discussed: No .     Postoperative Care    Pain management: Multi-modal analgesia.   PONV prophylaxis: Ondansetron (or other 5HT-3), Dexamethasone or Solumedrol     Comments:                Marguerite Sheridan MD

## 2023-04-10 NOTE — OP NOTE
Operative Note    Name:  Stefano Thompson  Location: [facility] Main OR  Procedure Date:  4/9/2023 - 4/10/2023  PCP:  Sony Lee    Surgery Performed:  Procedure/Surgery Information   Procedure: Procedure(s):  APPENDECTOMY, LAPAROSCOPIC   Surgeon(s): Surgeon(s) and Role:     * Joseph Orozco MD - Primary   Specimens: ID Type Source Tests Collected by Time Destination   1 : appendix Tissue Appendix SURGICAL PATHOLOGY EXAM Joseph Orozco MD 4/10/2023 12:31 AM                Pre-Procedure Diagnosis:  Appendicitis [K37]    Post-Procedure Diagnosis:    Appendicitis [K37]    Anesthesia:  General     Past Medical History:   Diagnosis Date     Unspecified sinusitis (chronic)        Patient Active Problem List    Diagnosis Date Noted     Recurrent urticaria 01/17/2017     Priority: Medium     CARDIOVASCULAR SCREENING; LDL GOAL LESS THAN 160 10/31/2010     Priority: Medium       Findings:  Inflamed Appendix    Operative Report:    Patient is brought to the operating room placed in supine position given general endotracheal anesthesia sterilely prepped and draped in the usual surgical fashion and a timeout procedure was undertaken.     The infraumbilical space was infused with local anesthetic and a 5 mm incisions made an Optiview trocar was used to advance the trocar and laparoscope into the intra-abdominal space under direct visualization of 0 degree laparoscope.  Pneumoperitoneum was brought up to 15 mmHg and 2 additional 5 mm trochars were placed along the midline under direct visualization of the laparoscope.     The cecum and appendix are identified to the appendix is  from its mesoappendix with use of the LigaSure device..  The appendix is skeletonized back to the origin of the appendix off of the cecum.  2 Endoloops were placed at the takeoff of the appendix.  The appendix was then divided with use of electrocautery and the laparoscopic scissors.  Cautery was applied to the  appendiceal mucosa upon removal of the appendix.  The appendix was placed in an Endo Catch bag and taken out through the lower of the 3 trocar sites.  The trocar site was then closed at the level of the fascia with a 0 Vicryl suture using an Endo fascial closure device under direct visualization of the laparoscope.     The patient had a tap block placed under direct visualization of the laparoscope infusing the local anesthetic in the plane between the internal oblique and the transversus abdominis muscle.  This was done all along the right side of the patient.     The pneumoperitoneum was evacuated and the trochars were removed.  The skin incisions were closed with 4-0 subcuticular Monocryl sutures and the wounds are closed with Telfa and Tegaderm.     Patient is extubated and taken to recovery    Estimated Blood Loss:   5 cc       Drains:        Implants:  * No implants in log *    Complications:    None    Joseph Orozco MD     Date: 4/10/2023  Time: 12:50 AM

## 2023-04-10 NOTE — ANESTHESIA CARE TRANSFER NOTE
Patient: Stefano Thompson    Procedure: Procedure(s):  APPENDECTOMY, LAPAROSCOPIC       Diagnosis: Appendicitis [K37]  Diagnosis Additional Information: No value filed.    Anesthesia Type:   General     Note:    Oropharynx: spontaneously breathing  Level of Consciousness: drowsy  Oxygen Supplementation: face mask    Independent Airway: airway patency satisfactory and stable  Dentition: dentition unchanged  Vital Signs Stable: post-procedure vital signs reviewed and stable  Report to RN Given: handoff report given  Patient transferred to: PACU  Comments: Pt. [place] and breathing spontaneously.  Vitals stable.  Report given to oncoming nurse.  Transfer of care occurred.   Handoff Report: Identifed the Patient, Identified the Reponsible Provider, Reviewed the pertinent medical history, Discussed the surgical course, Reviewed Intra-OP anesthesia mangement and issues during anesthesia, Set expectations for post-procedure period and Allowed opportunity for questions and acknowledgement of understanding      Vitals:  Vitals Value Taken Time   /73    Temp 97.3    Pulse 72 04/10/23 0056   Resp 24 04/10/23 0056   SpO2 100 % 04/10/23 0056   Vitals shown include unvalidated device data.    Electronically Signed By: RANDI Walker CRNA  April 10, 2023  12:58 AM

## 2023-04-10 NOTE — CONSULTS
"General surgery consult  And Preop h&P       ASSESSMENT     Acute appendicitis         PLAN      Laparoscopic appendectomy         CHIEF COMPLAINT   No chief complaint on file.        HPI     Stefano Thompson is a 53 year old male who presents abdominal pain that started 2 days ago.  It is localized in the right lower quadrant.  He has had nausea no vomiting.  He had some low-grade fevers but no chills or rigors.  He was evaluated at the [facility] urgency room with a CT scan showed evidence of an acute appendicitis.         PAST MEDICAL HISTORY SURGICAL HISTORY     Past Medical History:   Diagnosis Date     Unspecified sinusitis (chronic)     Past Surgical History:   Procedure Laterality Date     GRAFT SKIN FULL THICKNESS FROM EXTREMITY            CURRENT MEDICATIONS ALLERGIES     No current outpatient medications on file.    Allergies   Allergen Reactions     No Known Drug Allergies           FAMILY HISTORY     Family History   Problem Relation Age of Onset     Diabetes Father         type two     Hypertension Mother      C.A.D. No family hx of      Cancer No family hx of          SOCIAL HISTORY     Social History     Socioeconomic History     Marital status:      Spouse name: None     Number of children: None     Years of education: None     Highest education level: None   Tobacco Use     Smoking status: Never     Smokeless tobacco: Never   Substance and Sexual Activity     Alcohol use: Yes     Alcohol/week: 0.0 standard drinks of alcohol     Comment: 3 drinks per week     Drug use: No     Sexual activity: Yes     Partners: Female     Birth control/protection: Pill         REVIEW OF SYSTEMS       12 point review of systems are unremarkable except for the symptoms described in the HPI    PHYSICAL EXAM     VITAL SIGNS: /85   Pulse (!) 46   Temp 98.2  F (36.8  C) (Temporal)   Resp 16   Ht 1.905 m (6' 3\")   Wt 100.7 kg (222 lb)   SpO2 100%   BMI 27.75 kg/m     General : Alert, cooperative, appears " stated age   Skin: Skin color, texture, turgor normal, no rashes or lesions   Lymph nodes: No obvious adenopathy   Head: Normocephalic, without obvious abnormality, atraumatic   HEENT:  conjunctiva/corneas clear, EOM's intact, no scleral icterus   Throat: Lips, mucosa, and tongue normal; teeth and gums normal    Neck: Supple, thyroid not enlarged   Back: No CVA tenderness   Lungs: Clear to auscultation bilaterally, respirations unlabored, no rales, rhonchi or wheezes   Chest Wall:No tenderness or deformity   Heart: Regular rate and rhythm, S1, S2 normal, no murmur, rub or gallop   Abdomen: Soft, mildly tender to palpation in the right lower quadrant, no guarding, + bowel sounds active,   Extremities : No obvious swelling,  Neurologic: Cranial Nerves II-XII normal, moves all extremities equally, no focal findings          RADIOLOGY      Evidence for an acute appendicitis by report, this is an outside study I am unable to visualize it myself    EKG     Reviewed        LABS        Ref Range & Units  5:27 PM   WHITE BLOOD COUNT         4.6 - 10.2 thou/cu mm 6.0    RED BLOOD COUNT           4.04 - 6.13 mil/cu mm 4.22    HEMOGLOBIN                12.2 - 18.1 g/dL 14.2    HEMATOCRIT                37.7 - 53.7 % 42.6    MCV                       80 - 97 fL 101 High     MCH                       27.0 - 31.2 pg 33.6 High     MCHC                      31.8 - 35.4 g/dL 33.3    RDW                       11.6 - 14.8 % 13.5    PLATELET COUNT            142 - 424 thou/cu mm 155    MPV                       6.5 - 11.0 fL 8.1    % NEUT 37.0 - 80.0 % 68.2    % LYMPH 10.0 - 50.0 % 18.3    % MONO <=12.0 % 12.1 High     % EOS <=7.0 % 1.2    % BASO <=2.5 % 0.2    ABSOLUTE NEUTROPHILS      2.0 - 6.9 thou/cu mm 4.1    ABSOLUTE LYMPHOCYTES      0.6 - 3.4 thou/cu mm 1.1    ABSOLUTE MONOCYTES        <=0.9 thou/cu mm 0.7    ABSOLUTE EOSINOPHILS      <=0.7 thou/cu mm 0.1    ABSOLUTE BASOPHILS        <=0.3 thou/cu mm 0.0       Ref Range & Units   5:27 PM   SODIUM 137 - 145 mmol/L 138    POTASSIUM 3.5 - 5.1 mmol/L 3.8    CHLORIDE 98 - 107 mmol/L 105    CO2,TOTAL 22 - 30 mmol/L 26    ANION GAP 8 - 12 7 Low     GLUCOSE,RANDOM 74 - 106 mg/dL 107 High     CALCIUM 8.4 - 10.2 mg/dL 9.1    BUN 9 - 20 mg/dL 13    CREATININE 0.66 - 1.25 mg/dL 1.00    BUN/CREAT RATIO 10 - 20 13    eGFR >90 mL/min/1.73m2 89 Low               [facility]  [phone]

## 2023-04-10 NOTE — ANESTHESIA PROCEDURE NOTES
Airway       Patient location during procedure: OR       Procedure Start/Stop Times: 4/10/2023 12:04 AM  Staff -        CRNA: Shawna Crain APRN CRNA       Performed By: CRNA  Consent for Airway        Urgency: elective  Indications and Patient Condition       Indications for airway management: carmita-procedural       Induction type:intravenous       Mask difficulty assessment: 1 - vent by mask    Final Airway Details       Final airway type: endotracheal airway       Successful airway: ETT - single  Endotracheal Airway Details        ETT size (mm): 7.5       Cuffed: yes       Successful intubation technique: direct laryngoscopy       DL Blade Type: MAC 4       Grade View of Cords: 1       Adjucts: stylet       Position: Right       Measured from: gums/teeth       Secured at (cm): 23       Bite block used: None    Post intubation assessment        Placement verified by: capnometry, equal breath sounds and chest rise        Number of attempts at approach: 1       Number of other approaches attempted: 0       Secured with: silk tape       Ease of procedure: easy       Dentition: Intact and Unchanged    Medication(s) Administered   Medication Administration Time: 4/10/2023 12:04 AM

## 2023-04-10 NOTE — ANESTHESIA POSTPROCEDURE EVALUATION
Patient: Stefano Thompson    Procedure: Procedure(s):  APPENDECTOMY, LAPAROSCOPIC       Anesthesia Type:  General    Note:  Disposition: Outpatient   Postop Pain Control: Uneventful            Sign Out: Well controlled pain   PONV: No   Neuro/Psych: Uneventful            Sign Out: Acceptable/Baseline neuro status   Airway/Respiratory: Uneventful            Sign Out: Acceptable/Baseline resp. status   CV/Hemodynamics: Uneventful            Sign Out: Acceptable CV status; No obvious hypovolemia; No obvious fluid overload   Other NRE: NONE   DID A NON-ROUTINE EVENT OCCUR? No           Last vitals:  Vitals Value Taken Time   /80 04/10/23 0200   Temp 36.6  C (97.8  F) 04/10/23 0130   Pulse 53 04/10/23 0200   Resp 20 04/10/23 0200   SpO2 97 % 04/10/23 0200       Electronically Signed By: Marguerite Sheridan MD  April 10, 2023  2:20 AM

## 2023-04-12 LAB
PATH REPORT.COMMENTS IMP SPEC: NORMAL
PATH REPORT.COMMENTS IMP SPEC: NORMAL
PATH REPORT.FINAL DX SPEC: NORMAL
PATH REPORT.GROSS SPEC: NORMAL
PATH REPORT.MICROSCOPIC SPEC OTHER STN: NORMAL
PATH REPORT.RELEVANT HX SPEC: NORMAL
PHOTO IMAGE: NORMAL

## 2023-04-15 ENCOUNTER — HEALTH MAINTENANCE LETTER (OUTPATIENT)
Age: 53
End: 2023-04-15

## 2023-04-26 ENCOUNTER — TELEPHONE (OUTPATIENT)
Dept: SURGERY | Facility: CLINIC | Age: 53
End: 2023-04-26
Payer: COMMERCIAL

## 2023-04-26 NOTE — TELEPHONE ENCOUNTER
Spoke to Stefano. He says when he bends and twists he notices a sharp pain at the lower incision. He is about 3 weeks out from his laparoscopic appendectomy. He has been back to his normal routine and activity. No fevers or chills. Incisions are healed up well. I explained that it is common to still feel some twinges of sharp pain with certain movements and this can take several weeks to get better. I encouraged him to call if symptoms worse. He expressed understanding.       [facility]      Alicia Kapoor RN  [facility]  General Surgery  [address] [place] [id number]  [email or web address]   Office:[phone]  Employed by [facility] [employer] [facility],

## 2023-04-26 NOTE — TELEPHONE ENCOUNTER
Had surgery a few weeks ago and still having pain around incision site, the lowest one.  Sharp pain and burning sensation at time ok but moves the wrong way kind of intense and tender to the touch.    Please call and advise.    Thanks!

## 2024-03-19 ENCOUNTER — TELEPHONE (OUTPATIENT)
Dept: CARDIOLOGY | Facility: CLINIC | Age: 54
End: 2024-03-19
Payer: COMMERCIAL

## 2024-03-19 DIAGNOSIS — L50.8 RECURRENT URTICARIA: Primary | ICD-10-CM

## 2024-03-19 RX ORDER — PREDNISONE 20 MG/1
20 TABLET ORAL DAILY
Qty: 5 TABLET | Refills: 1 | Status: SHIPPED | OUTPATIENT
Start: 2024-03-19

## 2024-03-19 NOTE — TELEPHONE ENCOUNTER
Urticarial rash noted today.  Right arm (photo reviewed from patient). Noticed today.  Recurrent similar lesion respond to oral steroids.   No systemic symptoms.  Prednisone 20 mg daily for 5 days.

## 2024-06-16 ENCOUNTER — HEALTH MAINTENANCE LETTER (OUTPATIENT)
Age: 54
End: 2024-06-16

## 2025-03-25 ENCOUNTER — TRANSFERRED RECORDS (OUTPATIENT)
Dept: HEALTH INFORMATION MANAGEMENT | Facility: CLINIC | Age: 55
End: 2025-03-25
Payer: COMMERCIAL

## 2025-06-03 ENCOUNTER — TRANSFERRED RECORDS (OUTPATIENT)
Dept: HEALTH INFORMATION MANAGEMENT | Facility: CLINIC | Age: 55
End: 2025-06-03
Payer: COMMERCIAL

## 2025-06-21 ENCOUNTER — HEALTH MAINTENANCE LETTER (OUTPATIENT)
Age: 55
End: 2025-06-21

## (undated) DEVICE — SYSTEM LAPAROVUE VISIBILITY LAPVUE10

## (undated) DEVICE — BASIN EMESIS STERILE  SSK9005A

## (undated) DEVICE — PREP CHLORAPREP 26ML TINTED HI-LITE ORANGE 930815

## (undated) DEVICE — ENDO SHEARS RENEW LAP ENDOCUT SCISSOR TIP 16.5MM 3142

## (undated) DEVICE — ESU LIGASURE MARYLAND LAPAROSCOPIC SLR/DVDR 5MMX37CM LF1937

## (undated) DEVICE — ENDO TROCAR OPTICAL ACCESS KII Z-THRD 05X100MM CTR03

## (undated) DEVICE — PLATE GROUNDING ADULT W/CORD 9165L

## (undated) DEVICE — BLADE KNIFE SURG 11 371111

## (undated) DEVICE — ENDO TROCAR SLEEVE KII Z-THREADED 05X100MM CTS02

## (undated) DEVICE — Device

## (undated) DEVICE — SOL WATER IRRIG 1000ML BOTTLE 2F7114

## (undated) DEVICE — TUBING SMOKE EVAC PNEUMOCLEAR HIGH FLOW 0620050250

## (undated) DEVICE — GLOVE BIOGEL PI ULTRATOUCH G SZ 8.0 42180

## (undated) DEVICE — SUTURE ENDO SURGITIE 21 POLYSORB EL23LN

## (undated) DEVICE — DECANTER VIAL 2006S

## (undated) DEVICE — DRSG TELFA 3X4" 1050

## (undated) DEVICE — SU VICRYL+ 0 27 UR6 VLT VCP603H

## (undated) DEVICE — SU MONOCRYL+ 4-0 18IN PS2 UND MCP496G

## (undated) DEVICE — CUSTOM PACK LAP CHOLE SBA5BLCHEA

## (undated) DEVICE — GOWN XXL 9575

## (undated) DEVICE — DRSG TEGADERM 2 3/8X2 3/4" 1624W

## (undated) DEVICE — SUCTION MANIFOLD NEPTUNE 2 SYS 1 PORT 702-025-000

## (undated) RX ORDER — KETAMINE HYDROCHLORIDE 10 MG/ML
INJECTION INTRAMUSCULAR; INTRAVENOUS
Status: DISPENSED
Start: 2023-04-09

## (undated) RX ORDER — FENTANYL CITRATE 50 UG/ML
INJECTION, SOLUTION INTRAMUSCULAR; INTRAVENOUS
Status: DISPENSED
Start: 2023-04-09

## (undated) RX ORDER — CEFAZOLIN SODIUM 1 G/3ML
INJECTION, POWDER, FOR SOLUTION INTRAMUSCULAR; INTRAVENOUS
Status: DISPENSED
Start: 2023-04-09

## (undated) RX ORDER — DEXAMETHASONE SODIUM PHOSPHATE 10 MG/ML
INJECTION, SOLUTION INTRAMUSCULAR; INTRAVENOUS
Status: DISPENSED
Start: 2023-04-10

## (undated) RX ORDER — ONDANSETRON 2 MG/ML
INJECTION INTRAMUSCULAR; INTRAVENOUS
Status: DISPENSED
Start: 2023-04-10

## (undated) RX ORDER — PROPOFOL 10 MG/ML
INJECTION, EMULSION INTRAVENOUS
Status: DISPENSED
Start: 2023-04-10